# Patient Record
Sex: MALE | Race: WHITE | URBAN - METROPOLITAN AREA
[De-identification: names, ages, dates, MRNs, and addresses within clinical notes are randomized per-mention and may not be internally consistent; named-entity substitution may affect disease eponyms.]

---

## 2018-02-06 ENCOUNTER — TRANSFERRED RECORDS (OUTPATIENT)
Dept: HEALTH INFORMATION MANAGEMENT | Facility: CLINIC | Age: 83
End: 2018-02-06

## 2018-02-17 ENCOUNTER — APPOINTMENT (OUTPATIENT)
Dept: CT IMAGING | Facility: CLINIC | Age: 83
End: 2018-02-17
Attending: FAMILY MEDICINE
Payer: COMMERCIAL

## 2018-02-17 ENCOUNTER — HOSPITAL ENCOUNTER (EMERGENCY)
Facility: CLINIC | Age: 83
Discharge: HOME | End: 2018-02-17
Attending: FAMILY MEDICINE | Admitting: FAMILY MEDICINE
Payer: COMMERCIAL

## 2018-02-17 VITALS
TEMPERATURE: 98 F | OXYGEN SATURATION: 96 % | SYSTOLIC BLOOD PRESSURE: 149 MMHG | RESPIRATION RATE: 14 BRPM | DIASTOLIC BLOOD PRESSURE: 86 MMHG

## 2018-02-17 DIAGNOSIS — R10.9 ACUTE LEFT FLANK PAIN: ICD-10-CM

## 2018-02-17 DIAGNOSIS — R33.9 URINARY RETENTION WITH INCOMPLETE BLADDER EMPTYING: ICD-10-CM

## 2018-02-17 LAB
ALBUMIN SERPL-MCNC: 3.4 G/DL (ref 3.4–5)
ALBUMIN UR-MCNC: NEGATIVE MG/DL
ALP SERPL-CCNC: 76 U/L (ref 40–150)
ALT SERPL W P-5'-P-CCNC: 17 U/L (ref 0–70)
ANION GAP SERPL CALCULATED.3IONS-SCNC: 8 MMOL/L (ref 3–14)
APPEARANCE UR: CLEAR
AST SERPL W P-5'-P-CCNC: 14 U/L (ref 0–45)
BACTERIA #/AREA URNS HPF: ABNORMAL /HPF
BASOPHILS # BLD AUTO: 0 10E9/L (ref 0–0.2)
BASOPHILS NFR BLD AUTO: 0.2 %
BILIRUB SERPL-MCNC: 1.1 MG/DL (ref 0.2–1.3)
BILIRUB UR QL STRIP: NEGATIVE
BUN SERPL-MCNC: 26 MG/DL (ref 7–30)
CALCIUM SERPL-MCNC: 8.6 MG/DL (ref 8.5–10.1)
CHLORIDE SERPL-SCNC: 111 MMOL/L (ref 94–109)
CO2 SERPL-SCNC: 25 MMOL/L (ref 20–32)
COLOR UR AUTO: YELLOW
CREAT SERPL-MCNC: 1.35 MG/DL (ref 0.66–1.25)
DIFFERENTIAL METHOD BLD: ABNORMAL
EOSINOPHIL # BLD AUTO: 0.1 10E9/L (ref 0–0.7)
EOSINOPHIL NFR BLD AUTO: 1.6 %
ERYTHROCYTE [DISTWIDTH] IN BLOOD BY AUTOMATED COUNT: 15.9 % (ref 10–15)
GFR SERPL CREATININE-BSD FRML MDRD: 50 ML/MIN/1.7M2
GLUCOSE SERPL-MCNC: 103 MG/DL (ref 70–99)
GLUCOSE UR STRIP-MCNC: NEGATIVE MG/DL
HCT VFR BLD AUTO: 39.1 % (ref 40–53)
HGB BLD-MCNC: 11.9 G/DL (ref 13.3–17.7)
HGB UR QL STRIP: ABNORMAL
IMM GRANULOCYTES # BLD: 0 10E9/L (ref 0–0.4)
IMM GRANULOCYTES NFR BLD: 0.1 %
KETONES UR STRIP-MCNC: NEGATIVE MG/DL
LEUKOCYTE ESTERASE UR QL STRIP: NEGATIVE
LYMPHOCYTES # BLD AUTO: 0.9 10E9/L (ref 0.8–5.3)
LYMPHOCYTES NFR BLD AUTO: 10.5 %
MCH RBC QN AUTO: 27.4 PG (ref 26.5–33)
MCHC RBC AUTO-ENTMCNC: 30.4 G/DL (ref 31.5–36.5)
MCV RBC AUTO: 90 FL (ref 78–100)
MONOCYTES # BLD AUTO: 0.7 10E9/L (ref 0–1.3)
MONOCYTES NFR BLD AUTO: 8.4 %
MUCOUS THREADS #/AREA URNS LPF: PRESENT /LPF
NEUTROPHILS # BLD AUTO: 6.6 10E9/L (ref 1.6–8.3)
NEUTROPHILS NFR BLD AUTO: 79.2 %
NITRATE UR QL: NEGATIVE
PH UR STRIP: 5 PH (ref 5–7)
PLATELET # BLD AUTO: 143 10E9/L (ref 150–450)
POTASSIUM SERPL-SCNC: 3.8 MMOL/L (ref 3.4–5.3)
PROT SERPL-MCNC: 6.6 G/DL (ref 6.8–8.8)
RBC # BLD AUTO: 4.34 10E12/L (ref 4.4–5.9)
RBC #/AREA URNS AUTO: 13 /HPF (ref 0–2)
SODIUM SERPL-SCNC: 144 MMOL/L (ref 133–144)
SOURCE: ABNORMAL
SP GR UR STRIP: 1.01 (ref 1–1.03)
UROBILINOGEN UR STRIP-MCNC: 0 MG/DL (ref 0–2)
WBC # BLD AUTO: 8.4 10E9/L (ref 4–11)
WBC #/AREA URNS AUTO: 1 /HPF (ref 0–2)

## 2018-02-17 PROCEDURE — 99284 EMERGENCY DEPT VISIT MOD MDM: CPT | Mod: 25 | Performed by: FAMILY MEDICINE

## 2018-02-17 PROCEDURE — 80053 COMPREHEN METABOLIC PANEL: CPT | Performed by: FAMILY MEDICINE

## 2018-02-17 PROCEDURE — 51798 US URINE CAPACITY MEASURE: CPT | Performed by: FAMILY MEDICINE

## 2018-02-17 PROCEDURE — 81001 URINALYSIS AUTO W/SCOPE: CPT | Performed by: FAMILY MEDICINE

## 2018-02-17 PROCEDURE — 85025 COMPLETE CBC W/AUTO DIFF WBC: CPT | Performed by: FAMILY MEDICINE

## 2018-02-17 PROCEDURE — 99284 EMERGENCY DEPT VISIT MOD MDM: CPT | Mod: Z6 | Performed by: FAMILY MEDICINE

## 2018-02-17 PROCEDURE — 74176 CT ABD & PELVIS W/O CONTRAST: CPT

## 2018-02-17 PROCEDURE — 87086 URINE CULTURE/COLONY COUNT: CPT | Performed by: FAMILY MEDICINE

## 2018-02-17 RX ORDER — TRIAMCINOLONE ACETONIDE 1 MG/G
CREAM TOPICAL 2 TIMES DAILY
COMMUNITY

## 2018-02-17 RX ORDER — DORZOLAMIDE HCL 20 MG/ML
1 SOLUTION/ DROPS OPHTHALMIC
COMMUNITY

## 2018-02-17 RX ORDER — LORATADINE 10 MG/1
10 TABLET ORAL DAILY
COMMUNITY

## 2018-02-17 RX ORDER — CIPROFLOXACIN 500 MG/1
500 TABLET, FILM COATED ORAL 2 TIMES DAILY
Qty: 14 TABLET | Refills: 0 | Status: SHIPPED | OUTPATIENT
Start: 2018-02-17 | End: 2018-02-24

## 2018-02-17 RX ORDER — GUAIFENESIN 200 MG/1
400 TABLET ORAL 3 TIMES DAILY PRN
COMMUNITY

## 2018-02-17 ASSESSMENT — ENCOUNTER SYMPTOMS
FEVER: 0
CHILLS: 0
BLOOD IN STOOL: 0
NAUSEA: 1
DIARRHEA: 0
FREQUENCY: 1
DIFFICULTY URINATING: 1
VOMITING: 0
WHEEZING: 0
ABDOMINAL DISTENTION: 1
ABDOMINAL PAIN: 1
CONSTIPATION: 0
FLANK PAIN: 1
CHEST TIGHTNESS: 0
SORE THROAT: 0
DYSURIA: 0
COUGH: 0
SHORTNESS OF BREATH: 0
PALPITATIONS: 0
HEMATURIA: 0

## 2018-02-17 NOTE — ED NOTES
Pain Eval  Location of Pain: left flank  Duration of Pain: several hours  Rating of Pain: 8/10  Pain is better with:   Pain is worse with:  Treatment so far consists of: attempting to void-Bladder Scan showing 400 cc in bladder

## 2018-02-17 NOTE — DISCHARGE INSTRUCTIONS
Thank you for giving us the opportunity to see you. The impression is that you were having left flank pain with evidence for urinary retention with incomplete bladder emptying.  You may have an underlying urinary tract or prostate infection.    Take Cipro 500 mg twice a day for 7 days.  Monitor for any further urinary problems.  Return to the emergency department if you are unable to void.    A urine culture is pending. We will call you if your plan of care needs to change.     If you are not seeing an improvement within 3-5 days, please follow up with your primary care provider or clinic.     After discharge, please closely monitor for any new or worsening symptoms. Return to the Emergency Department at any time if your symptoms worsen.

## 2018-02-17 NOTE — ED PROVIDER NOTES
"  History     Chief Complaint   Patient presents with     Flank Pain     Urinary Retention     The history is provided by the patient and the spouse. No  was used.     Guilherme Cervantes is a 88 year old male with PMH urinary retention and blood clot in left leg on Apixaban who presents via EMS with 1 day acutely worsening urinary retention and 12 hours of left-sided flank pain.   Patient started experiencing weaker stream this fall (per wife) and is on Finasteride and Terazosin. Yesterday, noticed he was having more problems emptying his bladder and had dribbling. When he laid down for bed, started having severe (8-9/10) left-sided flank pain. He described it as a \"shocker to the kidney\". It was waxing and waning in quality. Started in his back, has migrated towards anterior flank and suprapubic area. Pain worsened with movement this morning, and patient decided to come in to ED. [place] some nausea this morning, no vomiting. Currently rates pain at a 3/10, saying he is okay laying down.   He said he had a few-second episode of similar pain earlier this week, but it resolved quickly.   Denies dysuria, malodorous urine, change in urine color, fevers/chills.     Problem List:    There are no active problems to display for this patient.       Past Medical History:    History reviewed. No pertinent past medical history.    Past Surgical History:    History reviewed. No pertinent surgical history.    Family History:    No family history on file.    Social History:  Marital Status:   [2]  Social History   Substance Use Topics     Smoking status: Never Smoker     Smokeless tobacco: Never Used     Alcohol use No        Medications:      APIXABAN PO   dorzolamide (TRUSOPT) 2 % ophthalmic solution   FINASTERIDE PO   guaiFENesin (ORGANIDIN) 200 MG TABS tablet   TERAZOSIN HCL PO   loratadine (CLARITIN) 10 MG tablet   triamcinolone (KENALOG) 0.1 % cream   ciprofloxacin (CIPRO) 500 MG tablet "         Review of Systems   Constitutional: Negative for chills and fever.   HENT: Negative for congestion and sore throat.    Respiratory: Negative for cough, chest tightness, shortness of breath and wheezing.    Cardiovascular: Positive for leg swelling (blood clot in left leg). Negative for chest pain and palpitations.   Gastrointestinal: Positive for abdominal distention, abdominal pain and nausea. Negative for blood in stool, constipation, diarrhea and vomiting.   Genitourinary: Positive for difficulty urinating, flank pain and frequency. Negative for dysuria and hematuria.   Skin: Negative for rash.       Physical Exam   BP: 144/88  Heart Rate: 75  Temp: 98  F (36.7  C)  Resp: 14  SpO2: 96 %      Physical Exam   Constitutional: He appears well-developed and well-nourished. No distress.   HENT:   Head: Normocephalic and atraumatic.   Cardiovascular: Normal rate, regular rhythm and normal heart sounds.  Exam reveals no gallop and no friction rub.    No murmur heard.  Pulmonary/Chest: Effort normal and breath sounds normal. No respiratory distress. He has no wheezes. He has no rales.   Abdominal: Soft. Bowel sounds are normal. He exhibits distension. He exhibits no mass. There is tenderness (suprapubic, left anterior and posterior flank). There is CVA tenderness. There is no rebound and no guarding.   Skin: He is not diaphoretic.       ED Course     ED Course     Procedures            Critical Care time:  none       Labs Ordered and Resulted from Time of ED Arrival Up to the Time of Departure from the ED   URINE MACROSCOPIC WITH REFLEX TO MICRO - Abnormal; Notable for the following:        Result Value    Blood Urine Small (*)     RBC Urine 13 (*)     Bacteria Urine Few (*)     Mucous Urine Present (*)     All other components within normal limits   CBC WITH PLATELETS DIFFERENTIAL - Abnormal; Notable for the following:     RBC Count 4.34 (*)     Hemoglobin 11.9 (*)     Hematocrit 39.1 (*)     MCHC 30.4 (*)      RDW 15.9 (*)     Platelet Count 143 (*)     All other components within normal limits   COMPREHENSIVE METABOLIC PANEL - Abnormal; Notable for the following:     Chloride 111 (*)     Glucose 103 (*)     Creatinine 1.35 (*)     GFR Estimate 50 (*)     GFR Estimate If Black 60 (*)     Protein Total 6.6 (*)     All other components within normal limits   PERIPHERAL IV CATHETER   STRAIGHT CATH FOR URINE   URINE CULTURE AEROBIC BACTERIAL     Abd/pelvis CT - no contrast - Stone Protocol (Preliminary result) Result time: 02/17/18 09:36:53     Preliminary result by Interface, Radiant Ib (02/17/18 09:36:53)     Impression:     IMPRESSION:   1. Cholelithiasis.  2. Right nephrolithiasis. No evidence of obstructive urolithiasis.  3. Thickening of the urinary bladder wall. This could be due to  cystitis but malignancy could also cause this appearance.  4. Retroperitoneal lymphadenopathy.  5. Colonic diverticulosis. No evidence of acute diverticulitis.     Narrative:     CT ABDOMEN AND PELVIS WITHOUT CONTRAST   2/17/2018  9:20 AM     HISTORY: Left flank.    TECHNIQUE: No IV contrast material. Radiation dose for this scan was  reduced using automated exposure control, adjustment of the mA and/or  kV according to patient size, or iterative reconstruction technique.    COMPARISON: None.    FINDINGS: There is mild scarring or atelectasis at the left lung base.  Calcified stones in the gallbladder. Within limits of noncontrast  technique, no acute abnormality identified in the liver, spleen,  pancreas, or adrenal glands.    The right kidney is atrophic. There is a 2 mm nonobstructing stone in  the lower pole of the right kidney. There are left renal cysts. There  are either left renal parapelvic cysts or mild hydronephrosis. 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 No ureteral calculi  are seen. There are atherosclerotic changes in the aorta but no  evidence of aneurysm. The bladder is contracted and thick-walled.  The  prostate is enlarged and indents on the base of the bladder. There are  calcifications in the prostate. There are several enlarged  retroperitoneal lymph nodes. The small and large bowel are normal in  caliber. There is colonic diverticulosis but no evidence of acute  diverticulitis. No abdominal or pelvic fluid collections. No evidence  of free intraperitoneal air.         Assessments & Plan (with Medical Decision Making)  88 year old male with PMH urinary retention and blood clot in left leg on Apixaban presents with 1 day worsening urinary retention and 12 hours left flank pain. Patient is on Finasteride and Terazosin. Patient reports trouble emptying bladder yesterday and then severe left flank pain when he laid down at night. Worsening throughout the night and with movement, with some nausea this morning. Brought in via EMS. Denies fevers/chills.  /88  Temp 98  F (36.7  C) (Temporal)  Resp 14  SpO2 96%   On exam, vss. Tenderness in suprapubic and left anterior/posterior flank areas.   Bladder scan showed > 375 mL urine. Patient was straight cathed and felt immediate relief.   UA showed small blood with RBCs and few bacteria. Cr of 1.35 (unknown baseline) and Hgb of 11.9 (unknown baseline).   CT did not show any kidney stone, but left hydronephrosis vs. Left renal cysts noted. Also noted thickening of bladder wall (cystitis vs malignancy) and enlarged prostate with calcifications. Also noted retroperitoneal LAD.     Possible patient has urinary tract or prostate infection leading to worsening urinary retention and possible left-sided hydronephrosis. Urine culture is pending. Pain improved with straight-cath. Will treat for possible infection with 7-days of Ciprofloxacin. Patient to follow-up with PCP if not improved with antibiotics in 3-5 days. May need urology referral for prostate biopsy or resection. Instructed to return to ED if unable to void again.        I have reviewed the nursing  notes.    I have reviewed the findings, diagnosis, plan and need for follow up with the patient.    New Prescriptions    CIPROFLOXACIN (CIPRO) 500 MG TABLET    Take 1 tablet (500 mg) by mouth 2 times daily for 7 days       Final diagnoses:   Urinary retention with incomplete bladder emptying   Acute left flank pain     This document serves as a record of the services and decisions personally performed and made by Roopa Arora MD.  It was created on his/her behalf by Yolanda Martinez, a trained medical student and scribe.  The creation of this record is based on the provider's personal observations and the statements of the patient.     Yolanda Martinez, MS3  February 17, 2018    ATTENDING NOTE: I was personally and directly involved in patient care including obtaining the history, performing the physical exam, ordering and reviewing of laboratory tests, and decision-making process. Plan of care was discussed with the patient.      2/17/2018   [facility] EMERGENCY DEPARTMENT     Roopa Arora MD  02/17/18 5907

## 2018-02-17 NOTE — ED AVS SNAPSHOT
[facility] Emergency Department    [address] [place] [address]    Phone:  [phone]    Fax:  [phone]                                       Guilherme Cervantes   MRN: [id number]    Department:  [facility] Emergency Department   Date of Visit:  2/17/2018           Patient Information     Date Of Birth          6/14/1929        Your diagnoses for this visit were:     Urinary retention with incomplete bladder emptying     Acute left flank pain        You were seen by Roopa Arora MD.      Follow-up Information     Follow up with [facility] Emergency Department.    Specialty:  EMERGENCY MEDICINE    Why:  If symptoms worsen    Contact information:    [address]   [place] [address] [phone]    Additional information:    From [address]: Exit at [address] on [address] side of [place]. Turn right on [address]. Turn left at stoplight on [address]. [facility] will be in view two blocks ahead        Follow up with [facility] In 3 days.    Why:  if not improving    Contact information:    [address] [place] [address]          Discharge Instructions       Thank you for giving us the opportunity to see you. The impression is that you were having left flank pain with evidence for urinary retention with incomplete bladder emptying.  You may have an underlying urinary tract or prostate infection.    Take Cipro 500 mg twice a day for 7 days.  Monitor for any further urinary problems.  Return to the emergency department if you are unable to void.    A urine culture is pending. We will call you if your plan of care needs to change.     If you are not seeing an improvement within 3-5 days, please follow up with your primary care provider or clinic.     After discharge, please closely monitor for any new or worsening symptoms. Return to the Emergency Department at any time if your symptoms worsen.        Discharge  References/Attachments     URINARY RETENTION, MALE (ENGLISH)    FLANK PAIN, UNCERTAIN CAUSE (ENGLISH)      24 Hour Appointment Hotline       To make an appointment at any [facility] clinic, call [phone] ([phone]). If you don't have a family doctor or clinic, we will help you find one. [facility] clinics are conveniently located to serve the needs of you and your family.             Review of your medicines      START taking        Dose / Directions Last dose taken    ciprofloxacin 500 MG tablet   Commonly known as:  CIPRO   Dose:  500 mg   Quantity:  14 tablet        Take 1 tablet (500 mg) by mouth 2 times daily for 7 days   Refills:  0          Our records show that you are taking the medicines listed below. If these are incorrect, please call your family doctor or clinic.        Dose / Directions Last dose taken    APIXABAN PO   Dose:  5 mg        Take 5 mg by mouth 2 times daily   Refills:  0        dorzolamide 2 % ophthalmic solution   Commonly known as:  TRUSOPT   Dose:  1 drop        Place 1 drop into both eyes 2 times daily   Refills:  0        FINASTERIDE PO   Dose:  5 mg        Take 5 mg by mouth daily   Refills:  0        guaiFENesin 200 MG Tabs tablet   Commonly known as:  ORGANIDIN   Dose:  400 mg        Take 400 mg by mouth 3 times daily as needed for cough   Refills:  0        loratadine 10 MG tablet   Commonly known as:  CLARITIN   Dose:  10 mg        Take 10 mg by mouth daily   Refills:  0        TERAZOSIN HCL PO   Dose:  10 mg        Take 10 mg by mouth At Bedtime   Refills:  0        triamcinolone 0.1 % cream   Commonly known as:  KENALOG        Apply topically 2 times daily   Refills:  0                Prescriptions were sent or printed at these locations (1 Prescription)                   [facility] [address] [place] [address] [address]  AT [facility] [address]   [address] , [facility] [address]    Telephone:  [phone]   Fax:  [phone]   Hours:       "            E-Prescribed (1 of 1)         ciprofloxacin (CIPRO) 500 MG tablet                Procedures and tests performed during your visit     Abd/pelvis CT - no contrast - Stone Protocol    CBC with platelets differential    Comprehensive metabolic panel    Peripheral IV catheter    Straight cath for urine    UA reflex to Microscopic    Urine Culture Aerobic Bacterial      Orders Needing Specimen Collection     None      Pending Results     Date and Time Order Name Status Description    2/17/2018 1013 Urine Culture Aerobic Bacterial In process     2/17/2018 0841 Abd/pelvis CT - no contrast - Stone Protocol Preliminary             Pending Culture Results     Date and Time Order Name Status Description    2/17/2018 1013 Urine Culture Aerobic Bacterial In process             Pending Results Instructions     If you had any lab results that were not finalized at the time of your Discharge, you can call the ED Lab Result RN at [phone]. You will be contacted by this team for any positive Lab results or changes in treatment. The nurses are available 7 days a week from 10A to 6:30P.  You can leave a message 24 hours per day and they will return your call.        Thank you for choosing [facility]       Thank you for choosing [facility] for your care. Our goal is always to provide you with excellent care. Hearing back from our patients is one way we can continue to improve our services. Please take a few minutes to complete the written survey that you may receive in the mail after you visit with us. Thank you!        [employer] Information     [employer] lets you send messages to your doctor, view your test results, renew your prescriptions, schedule appointments and more. To sign up, go to [email or web address] . Click on \"Log in\" on the left side of the screen, which will take you to the Welcome page. Then click on \"Sign up Now\" on the right side of the page.     You will be asked to enter the access code listed below, as " well as some personal information. Please follow the directions to create your username and password.     Your access code is: [email or web address]  Expires: 2018 10:41 AM     Your access code will  in 90 days. If you need help or a new code, please call your [facility] clinic or [phone].        Care EveryWhere ID     This is your Care EveryWhere ID. This could be used by other organizations to access your [facility] medical records  [id number]        Equal Access to Services     MARGY SONG : Arnold raglando Sogianna, waaxjenny luqadaha, qaybta kaalmajenny millan, sharita romero . So [facility] [phone].    ATENCIÓN: Si habla español, tiene a perez disposición servicios gratuitos de asistencia lingüística. Llame al [phone].    We comply with applicable federal civil rights laws and [place] laws. We do not discriminate on the basis of race, color, national origin, age, disability, sex, sexual orientation, or gender identity.            After Visit Summary       This is your record. Keep this with you and show to your community pharmacist(s) and doctor(s) at your next visit.

## 2018-02-17 NOTE — ED AVS SNAPSHOT
[facility] Emergency Department    [address] [place] [address]    Phone:  [phone]    Fax:  [phone]                                       Guilherme Cervantes   MRN: [id number]    Department:  [facility] Emergency Department   Date of Visit:  2/17/2018           After Visit Summary Signature Page     I have received my discharge instructions, and my questions have been answered. I have discussed any challenges I see with this plan with the nurse or doctor.    ..........................................................................................................................................  Patient/Patient Representative Signature      ..........................................................................................................................................  Patient Representative Print Name and Relationship to Patient    ..................................................               ................................................  Date                                            Time    ..........................................................................................................................................  Reviewed by Signature/Title    ...................................................              ..............................................  Date                                                            Time

## 2018-02-17 NOTE — ED NOTES
Presents via EMS with urinary retention. Dribbling urine through out the night. Issues with urinary frequency started yesterday.

## 2018-02-18 LAB
BACTERIA SPEC CULT: NO GROWTH
Lab: NORMAL
SPECIMEN SOURCE: NORMAL

## 2018-02-28 ENCOUNTER — OFFICE VISIT (OUTPATIENT)
Dept: UROLOGY | Facility: OTHER | Age: 83
End: 2018-02-28
Payer: COMMERCIAL

## 2018-02-28 VITALS — WEIGHT: 177.25 LBS | RESPIRATION RATE: 16 BRPM | SYSTOLIC BLOOD PRESSURE: 122 MMHG | DIASTOLIC BLOOD PRESSURE: 68 MMHG

## 2018-02-28 DIAGNOSIS — N40.1 BENIGN PROSTATIC HYPERPLASIA WITH LOWER URINARY TRACT SYMPTOMS, SYMPTOM DETAILS UNSPECIFIED: Primary | ICD-10-CM

## 2018-02-28 DIAGNOSIS — N40.2 PROSTATE NODULE: ICD-10-CM

## 2018-02-28 PROCEDURE — 99204 OFFICE O/P NEW MOD 45 MIN: CPT | Mod: 25 | Performed by: UROLOGY

## 2018-02-28 PROCEDURE — 51798 US URINE CAPACITY MEASURE: CPT | Performed by: UROLOGY

## 2018-02-28 RX ORDER — GUAIFENESIN 400 MG/1
TABLET ORAL
COMMUNITY
Start: 2017-11-08 | End: 2018-11-09

## 2018-02-28 RX ORDER — FINASTERIDE 5 MG/1
TABLET, FILM COATED ORAL
COMMUNITY
Start: 2017-11-22 | End: 2018-11-23

## 2018-02-28 RX ORDER — TRIAMCINOLONE ACETONIDE 1 MG/G
CREAM TOPICAL
COMMUNITY
Start: 2018-02-06 | End: 2019-02-07

## 2018-02-28 RX ORDER — TERAZOSIN 10 MG/1
CAPSULE ORAL
COMMUNITY
Start: 2017-11-22 | End: 2018-11-23

## 2018-02-28 RX ORDER — DORZOLAMIDE HCL 20 MG/ML
SOLUTION/ DROPS OPHTHALMIC
COMMUNITY
Start: 2017-11-08 | End: 2018-11-09

## 2018-02-28 ASSESSMENT — PAIN SCALES - GENERAL: PAINLEVEL: NO PAIN (0)

## 2018-02-28 NOTE — PATIENT INSTRUCTIONS
Your cystoscopy is scheduled 3/28/18 @ 10:00 Please call  if you need to reschedule this appointment.  .  Cystoscopy    Cystoscopy is a procedure that lets your doctor look directly inside your urethra and bladder. It can be used to:    Help diagnose a problem with your urethra, bladder, or kidneys.    Take a sample (biopsy) of bladder or urethral tissue.    Treat certain problems (such as removing kidney stones).    Place a stent to bypass an obstruction.    Take special X-rays of the kidneys.  Based on the findings, your doctor may recommend other tests or treatments.  What is a cystoscope?  A cystoscope is a telescope-like instrument that contains lenses and fiberoptics (small glass wires that make bright light). The cystoscope may be straight and rigid, or flexible to bend around curves in the urethra. The doctor may look directly into the cystoscope, or project the image onto a monitor.  Getting ready    Ask your doctor if you should stop taking any medicines before the procedure.    Ask whether you should avoid eating or drinking anything after midnight before the procedure.    Follow any other instructions your doctor gives you.  Tell your doctor before the exam if you:    Take any medicines, such as aspirin or blood thinners    Have allergies to any medicines    Are pregnant   The procedure  Cystoscopy is done in the doctor s office, surgery center, or hospital. The doctor and a nurse are present during the procedure. It takes only a few minutes, longer if a biopsy, X-ray, or treatment needs to be done.  During the procedure:    You lie on an exam table on your back, knees bent and legs apart. You are covered with a drape.    Your urethra and the area around it are washed. Anesthetic jelly may be applied to numb the urethra. Other pain medicine is usually not needed. In some cases, you may be offered a mild sedative to help you relax. If a more extensive procedure is to be done, such as a biopsy  or kidney stone removal, general anesthesia may be needed.    The cystoscope is inserted. A sterile fluid is put into the bladder to expand it. You may feel pressure from this fluid.    When the procedure is done, the cystoscope is removed.  After the procedure  If you had a sedative, general anesthesia, or spinal anesthesia, you must have someone drive you home. Once you re home:    Drink plenty of fluids.    You may have burning or light bleeding when you urinate--this is normal.    Medicines may be prescribed to ease any discomfort or prevent infection. Take these as directed.    Call your doctor if you have heavy bleeding or blood clots, burning that lasts more than a day, a fever over 100 F  (38  C), or trouble urinating.  Date Last Reviewed: 1/1/2017 2000-2017 [employer]. [address] [place] [address], [place] [address]. All rights reserved. This information is not intended as a substitute for professional medical care. Always follow your healthcare professional's instructions.

## 2018-02-28 NOTE — MR AVS SNAPSHOT
After Visit Summary   2/28/2018    Guilherme Cervantes    MRN: [id number]           Patient Information     Date Of Birth          6/14/1929        Visit Information        Provider Department      2/28/2018 8:45 AM David Amos MD [facility] Instructions    Your cystoscopy is scheduled 3/28/18 @ 10:00 Please call  if you need to reschedule this appointment.  .  Cystoscopy    Cystoscopy is a procedure that lets your doctor look directly inside your urethra and bladder. It can be used to:    Help diagnose a problem with your urethra, bladder, or kidneys.    Take a sample (biopsy) of bladder or urethral tissue.    Treat certain problems (such as removing kidney stones).    Place a stent to bypass an obstruction.    Take special X-rays of the kidneys.  Based on the findings, your doctor may recommend other tests or treatments.  What is a cystoscope?  A cystoscope is a telescope-like instrument that contains lenses and fiberoptics (small glass wires that make bright light). The cystoscope may be straight and rigid, or flexible to bend around curves in the urethra. The doctor may look directly into the cystoscope, or project the image onto a monitor.  Getting ready    Ask your doctor if you should stop taking any medicines before the procedure.    Ask whether you should avoid eating or drinking anything after midnight before the procedure.    Follow any other instructions your doctor gives you.  Tell your doctor before the exam if you:    Take any medicines, such as aspirin or blood thinners    Have allergies to any medicines    Are pregnant   The procedure  Cystoscopy is done in the doctor s office, surgery center, or hospital. The doctor and a nurse are present during the procedure. It takes only a few minutes, longer if a biopsy, X-ray, or treatment needs to be done.  During the procedure:    You lie on an exam table on your back, knees bent and legs  apart. You are covered with a drape.    Your urethra and the area around it are washed. Anesthetic jelly may be applied to numb the urethra. Other pain medicine is usually not needed. In some cases, you may be offered a mild sedative to help you relax. If a more extensive procedure is to be done, such as a biopsy or kidney stone removal, general anesthesia may be needed.    The cystoscope is inserted. A sterile fluid is put into the bladder to expand it. You may feel pressure from this fluid.    When the procedure is done, the cystoscope is removed.  After the procedure  If you had a sedative, general anesthesia, or spinal anesthesia, you must have someone drive you home. Once you re home:    Drink plenty of fluids.    You may have burning or light bleeding when you urinate--this is normal.    Medicines may be prescribed to ease any discomfort or prevent infection. Take these as directed.    Call your doctor if you have heavy bleeding or blood clots, burning that lasts more than a day, a fever over 100 F  (38  C), or trouble urinating.  Date Last Reviewed: 1/1/2017 2000-2017 [employer]. [address] [place] [address]. All rights reserved. This information is not intended as a substitute for professional medical care. Always follow your healthcare professional's instructions.                Follow-ups after your visit        Your next 10 appointments already scheduled     Mar 28, 2018 10:00 AM CDT   Return Visit with David Amos MD   [facility] ([facility])    [address] [id number] [phone]              Who to contact     If you have questions or need follow up information about today's clinic visit or your schedule please contact [facility] directly at [phone].  Normal or non-critical lab and imaging results will be communicated to you by MyChart, letter or phone within 4 business days after the clinic  "has received the results. If you do not hear from us within 7 days, please contact the clinic through [employer] or phone. If you have a critical or abnormal lab result, we will notify you by phone as soon as possible.  Submit refill requests through [employer] or call your pharmacy and they will forward the refill request to us. Please allow 3 business days for your refill to be completed.          Additional Information About Your Visit        [employer] Information     [employer] lets you send messages to your doctor, view your test results, renew your prescriptions, schedule appointments and more. To sign up, go to [email or web address] . Click on \"Log in\" on the left side of the screen, which will take you to the Welcome page. Then click on \"Sign up Now\" on the right side of the page.     You will be asked to enter the access code listed below, as well as some personal information. Please follow the directions to create your username and password.     Your access code is: [email or web address]  Expires: 2018 10:41 AM     Your access code will  in 90 days. If you need help or a new code, please call your [place] clinic or [phone].        Care EveryWhere ID     This is your Care EveryWhere ID. This could be used by other organizations to access your [place] medical records  [id number]        Your Vitals Were     Respirations                   16            Blood Pressure from Last 3 Encounters:   18 122/68   18 149/86    Weight from Last 3 Encounters:   18 80.4 kg (177 lb 4 oz)              Today, you had the following     No orders found for display       Primary Care Provider Fax #    [facility] [phone] [address] [address]        Equal Access to Services     MARGY SONG : Arnold Wetzel, robin mcintosh, sharita jacobs. So [facility] [phone].    ATENCIÓN: Si habla español tiene " a perez disposición servicios gratuitos de asistencia lingüística. Ashwini ziegler [phone].    We comply with applicable federal civil rights laws and [place] laws. We do not discriminate on the basis of race, color, national origin, age, disability, sex, sexual orientation, or gender identity.            Thank you!     Thank you for choosing [facility]  for your care. Our goal is always to provide you with excellent care. Hearing back from our patients is one way we can continue to improve our services. Please take a few minutes to complete the written survey that you may receive in the mail after your visit with us. Thank you!             Your Updated Medication List - Protect others around you: Learn how to safely use, store and throw away your medicines at www.disposemymeds.org.          This list is accurate as of 2/28/18  9:29 AM.  Always use your most recent med list.                   Brand Name Dispense Instructions for use Diagnosis    * APIXABAN PO      Take 5 mg by mouth 2 times daily        * apixaban ANTICOAGULANT 5 MG tablet    ELIQUIS     TAKE ONE TABLET BY MOUTH TWICE A DAY TO PREVENT BLOOD CLOTS        DAILY MULTIVITAMIN PO      TAKE 1 CAPSULE BY MOUTH TWICE A DAY        * dorzolamide 2 % ophthalmic solution    TRUSOPT     Place 1 drop into both eyes 2 times daily        * dorzolamide 2 % ophthalmic solution    TRUSOPT     INSTILL 1 DROP IN EACH EYE TWICE A DAY FOR INCREASED EYE PRESSURE        finasteride 5 MG tablet    PROSCAR     TAKE ONE TABLET BY MOUTH EVERY DAY FOR PROSTATE        FINASTERIDE PO      Take 5 mg by mouth daily        * guaiFENesin 200 MG Tabs tablet    ORGANIDIN     Take 400 mg by mouth 3 times daily as needed for cough        * guaiFENesin 400 MG Tabs      TAKE ONE TABLET BY MOUTH THREE TIMES A DAY AS NEEDED CONGESTION        loratadine 10 MG tablet    CLARITIN     Take 10 mg by mouth daily        * TERAZOSIN HCL PO      Take 10 mg by mouth At Bedtime        *  terazosin 10 MG capsule    HYTRIN     TAKE TWO CAPSULES BY MOUTH AT BEDTIME FOR PROSTATE        * triamcinolone 0.1 % cream    KENALOG     Apply topically 2 times daily        * triamcinolone 0.1 % cream    KENALOG     APPLY THIN FILM TO AFFECTED AREA TWICE A DAY AS NEEDED FOR RASH        * Notice:  This list has 10 medication(s) that are the same as other medications prescribed for you. Read the directions carefully, and ask your doctor or other care provider to review them with you.

## 2018-02-28 NOTE — NURSING NOTE
Chief Complaint   Patient presents with     Consult     Urinary retention       Initial /68 (BP Location: Right arm, Patient Position: Sitting, Cuff Size: Adult Regular)  Resp 16  Wt 177 lb 4 oz (80.4 kg) There is no height or weight on file to calculate BMI.  Medication Reconciliation: complete     Miri Saucedo, CMA

## 2018-02-28 NOTE — PROGRESS NOTES
Bladder Scan performed. 12 ml maximum residual urine detected after 3 scans. MD informed.    Miri Saucedo, CMA

## 2018-02-28 NOTE — PROGRESS NOTES
S: Guilherme Cervantes is a pleasant  88 year old male who was seen  for a consult with regard to patient's urinary complaints.  Patient complains of recent urinary retention.  He had bladder drainage done in the ER.  He is on terazosin and possible finasteride.   Symptoms have been on going for  many years(s).  Seems to be worsened over time.  His AUA Symptom Score:  23.  His QOL score:  6.    Current Outpatient Prescriptions   Medication Sig Dispense Refill     apixaban ANTICOAGULANT (ELIQUIS) 5 MG tablet TAKE ONE TABLET BY MOUTH TWICE A DAY TO PREVENT BLOOD CLOTS       dorzolamide (TRUSOPT) 2 % ophthalmic solution INSTILL 1 DROP IN EACH EYE TWICE A DAY FOR INCREASED EYE PRESSURE       finasteride (PROSCAR) 5 MG tablet TAKE ONE TABLET BY MOUTH EVERY DAY FOR PROSTATE       guaiFENesin 400 MG TABS TAKE ONE TABLET BY MOUTH THREE TIMES A DAY AS NEEDED CONGESTION       terazosin (HYTRIN) 10 MG capsule TAKE TWO CAPSULES BY MOUTH AT BEDTIME FOR PROSTATE       triamcinolone (KENALOG) 0.1 % cream APPLY THIN FILM TO AFFECTED AREA TWICE A DAY AS NEEDED FOR RASH       Multiple Vitamins-Minerals (DAILY MULTIVITAMIN PO) TAKE 1 CAPSULE BY MOUTH TWICE A DAY       APIXABAN PO Take 5 mg by mouth 2 times daily       dorzolamide (TRUSOPT) 2 % ophthalmic solution Place 1 drop into both eyes 2 times daily       FINASTERIDE PO Take 5 mg by mouth daily       guaiFENesin (ORGANIDIN) 200 MG TABS tablet Take 400 mg by mouth 3 times daily as needed for cough       TERAZOSIN HCL PO Take 10 mg by mouth At Bedtime       loratadine (CLARITIN) 10 MG tablet Take 10 mg by mouth daily       triamcinolone (KENALOG) 0.1 % cream Apply topically 2 times daily       Allergies   Allergen Reactions     Trospium Diarrhea     Yeast Hives     Past Medical History:   Diagnosis Date     DVT (deep venous thrombosis) (H)      Past Surgical History:   Procedure Laterality Date     back surgery for spinal stenosis       KNEE SURGERY        No family history on  file.  He does not have a family history of prostate cancer.  Social History     Social History     Marital status:      Spouse name: N/A     Number of children: N/A     Years of education: N/A     Social History Main Topics     Smoking status: Former Smoker     Smokeless tobacco: Never Used     Alcohol use No     Drug use: No     Sexual activity: Not Asked     Other Topics Concern     None     Social History Narrative        REVIEW OF SYSTEMS  =================  C: NEGATIVE for fever, chills, change in weight  I: NEGATIVE for worrisome rashes, moles or lesions  E/M: NEGATIVE for ear, mouth and throat problems  R: NEGATIVE for significant cough or SHORTNESS OF BREATH  CV:  NEGATIVE for chest pain, palpitations or peripheral edema  GI: NEGATIVE for nausea, abdominal pain, heartburn, or change in bowel habits  NEURO: NEGATIVE numbness/weakness  : see HPI  PSYCH: NEGATIVE depression/anxiety  LYmph: no new enlarged lymph nodes  Ortho: no new trauma/movements           O: Exam:  Constitutional: healthy, alert and no distress  Head: Normocephalic. No masses, lesions, tenderness or abnormalities  ENT: ENT exam normal, no neck nodes or sinus tenderness  Cardiovascular: negative, PMI normal.   Respiratory: negative, no evidence of respiratory distress  Gastrointestinal: Abdomen soft, non-tender. BS normal. No masses, organomegaly  : penis no d/c. Testis no masses.  No scrotal skin lesion.  Prostate 50 gm plus hard nodule.  Musculoskeletal: extremities normal- no gross deformities noted, gait normal and normal muscle tone  Skin: no suspicious lesions or rashes  Neurologic: Alert and oriented  Psychiatric: mentation appears normal. and affect normal/bright  Hematologic/Lymphatic/Immunologic: normal ant/post cervical, axillary, supraclavicular and inguinal nodes    Assessment/Plan:   (N40.1) Benign prostatic hyperplasia with lower urinary tract symptoms, symptom details unspecified  (primary encounter  diagnosis)  Comment: bladder scan 14 ml.  Recent urinary retention  Plan: cont with meds           Schedule for cysto next           Discussed TURP    (N40.2) Prostate nodule  Comment:    Plan: abnormal exam discussed          Discussed pros and cons with pursuing prostate ca           He and his wife do not want further investigations.

## 2018-03-05 ENCOUNTER — OFFICE VISIT (OUTPATIENT)
Dept: FAMILY MEDICINE | Facility: OTHER | Age: 83
End: 2018-03-05
Payer: COMMERCIAL

## 2018-03-05 VITALS
DIASTOLIC BLOOD PRESSURE: 72 MMHG | HEART RATE: 94 BPM | OXYGEN SATURATION: 98 % | BODY MASS INDEX: 27.47 KG/M2 | SYSTOLIC BLOOD PRESSURE: 124 MMHG | HEIGHT: 67 IN | TEMPERATURE: 98.1 F | RESPIRATION RATE: 16 BRPM | WEIGHT: 175 LBS

## 2018-03-05 DIAGNOSIS — N40.1 BENIGN PROSTATIC HYPERPLASIA WITH URINARY OBSTRUCTION: Primary | ICD-10-CM

## 2018-03-05 DIAGNOSIS — R33.9 URINARY RETENTION: ICD-10-CM

## 2018-03-05 DIAGNOSIS — N13.8 BENIGN PROSTATIC HYPERPLASIA WITH URINARY OBSTRUCTION: Primary | ICD-10-CM

## 2018-03-05 PROBLEM — C44.310 BASAL CELL CARCINOMA OF FACE: Status: ACTIVE | Noted: 2018-03-05

## 2018-03-05 PROBLEM — K57.30 DIVERTICULOSIS OF COLON: Status: ACTIVE | Noted: 2018-03-05

## 2018-03-05 PROBLEM — H35.54 RETINAL PIGMENT EPITHELIAL ATROPHY: Status: ACTIVE | Noted: 2018-03-05

## 2018-03-05 PROBLEM — I10 ESSENTIAL HYPERTENSION: Status: ACTIVE | Noted: 2018-03-05

## 2018-03-05 PROBLEM — H34.8392 BRANCH RETINAL VEIN OCCLUSION (H): Status: ACTIVE | Noted: 2018-03-05

## 2018-03-05 PROBLEM — C44.211: Status: ACTIVE | Noted: 2018-03-05

## 2018-03-05 PROBLEM — H35.3230 BILATERAL EXUDATIVE AGE-RELATED MACULAR DEGENERATION (H): Status: ACTIVE | Noted: 2018-03-05

## 2018-03-05 PROBLEM — Z96.1 PSEUDOPHAKIA: Status: ACTIVE | Noted: 2018-03-05

## 2018-03-05 PROBLEM — H54.8 LEGAL BLINDNESS: Status: ACTIVE | Noted: 2018-03-05

## 2018-03-05 PROBLEM — H40.9 GLAUCOMA: Status: ACTIVE | Noted: 2018-03-05

## 2018-03-05 PROBLEM — I82.409 ACUTE DEEP VEIN THROMBOSIS (DVT) (H): Status: ACTIVE | Noted: 2018-03-05

## 2018-03-05 PROBLEM — Z96.659 KNEE JOINT REPLACED BY OTHER MEANS: Status: ACTIVE | Noted: 2018-03-05

## 2018-03-05 PROBLEM — H93.19 SUBJECTIVE TINNITUS: Status: ACTIVE | Noted: 2018-03-05

## 2018-03-05 PROBLEM — Z80.0 FAMILY HISTORY OF MALIGNANT NEOPLASM OF GASTROINTESTINAL TRACT: Status: ACTIVE | Noted: 2018-03-05

## 2018-03-05 PROBLEM — M48.02 SPINAL STENOSIS OF CERVICAL REGION: Status: ACTIVE | Noted: 2018-03-05

## 2018-03-05 PROBLEM — L57.0 ACTINIC KERATOSIS: Status: ACTIVE | Noted: 2018-03-05

## 2018-03-05 PROBLEM — J44.9 COPD (CHRONIC OBSTRUCTIVE PULMONARY DISEASE) (H): Status: ACTIVE | Noted: 2018-03-05

## 2018-03-05 PROBLEM — H90.3 SENSORINEURAL HEARING LOSS (SNHL) OF BOTH EARS: Status: ACTIVE | Noted: 2018-03-05

## 2018-03-05 PROBLEM — N40.0 ENLARGED PROSTATE: Status: ACTIVE | Noted: 2018-03-05

## 2018-03-05 PROBLEM — H25.819 COMBINED FORMS OF AGE-RELATED CATARACT: Status: ACTIVE | Noted: 2018-03-05

## 2018-03-05 PROBLEM — J45.909 ASTHMA WITHOUT STATUS ASTHMATICUS: Status: ACTIVE | Noted: 2018-03-05

## 2018-03-05 PROCEDURE — 51701 INSERT BLADDER CATHETER: CPT | Performed by: FAMILY MEDICINE

## 2018-03-05 PROCEDURE — 99213 OFFICE O/P EST LOW 20 MIN: CPT | Mod: 25 | Performed by: FAMILY MEDICINE

## 2018-03-05 NOTE — MR AVS SNAPSHOT
"              After Visit Summary   3/5/2018    Guilherme Cervantes    MRN: [id number]           Patient Information     Date Of Birth          6/14/1929        Visit Information        Provider Department      3/5/2018 11:45 AM Jannie Aparicio MD [facility]        Care Instructions    Drained the bladder today    Continue taking all of you medications, including the Finasteride.     Follow up with Dr. Amos at the end of the month for the cystoscopy.           Follow-ups after your visit        Follow-up notes from your care team     Return if symptoms worsen or fail to improve.      Your next 10 appointments already scheduled     Mar 28, 2018 10:00 AM CDT   Return Visit with David Amos MD   [facility] ([facility])    [address] [facility] [address] [facility] [address] [phone]              Who to contact     If you have questions or need follow up information about today's clinic visit or your schedule please contact [facility] directly at [phone].  Normal or non-critical lab and imaging results will be communicated to you by [employer], letter or phone within 4 business days after the clinic has received the results. If you do not hear from us within 7 days, please contact the clinic through [employer] or phone. If you have a critical or abnormal lab result, we will notify you by phone as soon as possible.  Submit refill requests through [employer] or call your pharmacy and they will forward the refill request to us. Please allow 3 business days for your refill to be completed.          Additional Information About Your Visit        [employer] Information     [employer] lets you send messages to your doctor, view your test results, renew your prescriptions, schedule appointments and more. To sign up, go to [email or web address] . Click on \"Log in\" on the left side of the screen, which will take you to the Welcome page. Then click on \"Sign up Now\" on " "the right side of the page.     You will be asked to enter the access code listed below, as well as some personal information. Please follow the directions to create your username and password.     Your access code is: [email or web address]  Expires: 2018 10:41 AM     Your access code will  in 90 days. If you need help or a new code, please call your [place] clinic or [phone].        Care EveryWhere ID     This is your Care EveryWhere ID. This could be used by other organizations to access your [place] medical records  [id number]        Your Vitals Were     Pulse Temperature Respirations Height Pulse Oximetry BMI (Body Mass Index)    94 98.1  F (36.7  C) (Temporal) 16 1.702 m (5' 7\") 98% 27.41 kg/m2       Blood Pressure from Last 3 Encounters:   18 124/72   18 122/68   18 149/86    Weight from Last 3 Encounters:   18 79.4 kg (175 lb)   18 80.4 kg (177 lb 4 oz)              Today, you had the following     No orders found for display       Primary Care Provider Fax #    [facility] [phone] [address] [address]        Equal Access to Services     MARGY SONG : Hadii aad ku hadasho Soomaali, waaxda luqadaha, qaybta kaalmada adeegyada, waxay idiin haydebra romero . So [facility] [phone].    ATENCIÓN: Si habla español, tiene a perez disposición servicios gratuitos de asistencia lingüística. Llame al [phone].    We comply with applicable federal civil rights laws and [place] laws. We do not discriminate on the basis of race, color, national origin, age, disability, sex, sexual orientation, or gender identity.            Thank you!     Thank you for choosing [facility]  for your care. Our goal is always to provide you with excellent care. Hearing back from our patients is one way we can continue to improve our services. Please take a few minutes to complete the written survey that you may receive in the mail " after your visit with us. Thank you!             Your Updated Medication List - Protect others around you: Learn how to safely use, store and throw away your medicines at www.disposemymeds.org.          This list is accurate as of 3/5/18 12:30 PM.  Always use your most recent med list.                   Brand Name Dispense Instructions for use Diagnosis    * APIXABAN PO      Take 5 mg by mouth 2 times daily        * apixaban ANTICOAGULANT 5 MG tablet    ELIQUIS     TAKE ONE TABLET BY MOUTH TWICE A DAY TO PREVENT BLOOD CLOTS        DAILY MULTIVITAMIN PO      TAKE 1 CAPSULE BY MOUTH TWICE A DAY        * dorzolamide 2 % ophthalmic solution    TRUSOPT     Place 1 drop into both eyes 2 times daily        * dorzolamide 2 % ophthalmic solution    TRUSOPT     INSTILL 1 DROP IN EACH EYE TWICE A DAY FOR INCREASED EYE PRESSURE        finasteride 5 MG tablet    PROSCAR     TAKE ONE TABLET BY MOUTH EVERY DAY FOR PROSTATE        FINASTERIDE PO      Take 5 mg by mouth daily        * guaiFENesin 200 MG Tabs tablet    ORGANIDIN     Take 400 mg by mouth 3 times daily as needed for cough        * guaiFENesin 400 MG Tabs      TAKE ONE TABLET BY MOUTH THREE TIMES A DAY AS NEEDED CONGESTION        loratadine 10 MG tablet    CLARITIN     Take 10 mg by mouth daily        * TERAZOSIN HCL PO      Take 10 mg by mouth At Bedtime        * terazosin 10 MG capsule    HYTRIN     TAKE TWO CAPSULES BY MOUTH AT BEDTIME FOR PROSTATE        * triamcinolone 0.1 % cream    KENALOG     Apply topically 2 times daily        * triamcinolone 0.1 % cream    KENALOG     APPLY THIN FILM TO AFFECTED AREA TWICE A DAY AS NEEDED FOR RASH        * Notice:  This list has 10 medication(s) that are the same as other medications prescribed for you. Read the directions carefully, and ask your doctor or other care provider to review them with you.

## 2018-03-05 NOTE — PROGRESS NOTES
VORB to straight cath patient.    Straight catheter insertion documentation on 3/5/2018:    Guilherme Cervantes presents to the clinic for urinary retention and unable to void  Reason for insertion: urinary retention  Order has been verified. AE VORB  Catheter successfully inserted into the urethral meatus in the usual sterile fashion without immediate complication.  Type of catheter placed: 16 Azeri straight catheter  Urine is yellow in color slight pinkish with blood at the end of the stream  800 cc's of urine output returned.    The patient tolerated the procedure and was instructed to follow up with Dr Amos, monitor for pain or discomfort, return or call for pain, fever, leakage or decreased urine flow and watch for signs of infection    Mahogany Villalpando RN, BSN

## 2018-03-05 NOTE — Clinical Note
Needed to catheter drain Guilherme due to retention of 800 cc of urine.  Feels much better after, but was just drained 2 weeks ago, so thought I would update you. Jannie Aparicio MD

## 2018-03-05 NOTE — PROGRESS NOTES
SUBJECTIVE:   Guilherme Cervantes is a 88 year old male who presents to clinic today for the following health issues:    HPI  Genitourinary - Male  Onset: 1 day, has had issues as recently as two weeks ago when he was seen at the hospital and needed to have his bladder drained    Description:   Dysuria (painful urination): yes  Hematuria (blood in urine): no   Frequency: yes - yesterday and last night  Are you urinating at night : yes  Hesitancy (delay in urine): no   Retention (unable to empty): YES  Decrease in urinary flow: no   Incontinence: no     Progression of Symptoms:  worsening    Accompanying Signs & Symptoms:  Fever: no   Back/Flank pain: YES  Urethral discharge: no   Testicle lumps/masses/pain: no   Nausea and/or vomiting: no   Abdominal pain: pressure    History:   History of frequent UTI's: YES  History of kidney stones: no   History of hernias: no   Personal or Family history of Prostate problems: YES- not cancer  Sexually active: no     Precipitating factors:   None    Alleviating factors:  None     He is thinking that today he will need to have his bladder drained. He saw Dr. Amos who recommended a cystoscopy, but they aren't scheduled until the end of the month.     Problem list and histories reviewed & adjusted, as indicated.  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)     Combined forms of age-related cataract     COPD (chronic obstructive pulmonary disease) (H)     Diverticulosis of colon     Enlarged prostate     Family history of malignant neoplasm of gastrointestinal tract     Glaucoma     Essential hypertension     Legal blindness     Osteoarthrosis involving lower leg     Pseudophakia     Retinal pigment epithelial atrophy      "Knee joint replaced by other means     Sensorineural hearing loss (SNHL) of both ears     Spinal stenosis of cervical region     Subjective tinnitus     Branch retinal vein occlusion     Past Surgical History:   Procedure Laterality Date     back surgery for spinal stenosis       KNEE SURGERY         Social History   Substance Use Topics     Smoking status: Former Smoker     Smokeless tobacco: Never Used     Alcohol use No     History reviewed. No pertinent family history.        ROS:  Constitutional, HEENT, cardiovascular, pulmonary, GI, , musculoskeletal, neuro, skin, endocrine and psych systems are negative, except as in HPI or otherwise noted.     This document serves as a record of the services and decisions personally performed and made by Jannie Aparicio MD. It was created on her behalf by Vonda Ellis, a trained medical scribe. The creation of this document is based the provider's statements to the medical scribe.  Vonda Ellis, March 5, 2018 11:54 AM       OBJECTIVE:                                                    /72 (BP Location: Left arm, Patient Position: Chair, Cuff Size: Adult Regular)  Pulse 94  Temp 98.1  F (36.7  C) (Temporal)  Resp 16  Ht 5' 7\" (1.702 m)  Wt 175 lb (79.4 kg)  SpO2 98%  BMI 27.41 kg/m2  Body mass index is 27.41 kg/(m^2).   GENERAL: healthy, alert, well nourished, well hydrated, no distress  SKIN: age related skin changes  : Bladder scan shows around 688 ml in the bladder  PSYCH: Alert and oriented times 3; speech- coherent , normal rate and volume; able to articulate logical thoughts, able to abstract reason, no tangential thoughts, no hallucinations or delusions, affect- normal    Diagnostic test results:  No results found for this or any previous visit (from the past 24 hour(s)).     ASSESSMENT/PLAN:                                                        ICD-10-CM    1. Benign prostatic hyperplasia with urinary obstruction N40.1 INSERT BLADDER CATH, NON-INDWELLING    " N13.8    2. Urinary retention R33.9 INSERT BLADDER CATH, NON-INDWELLING       Will want to drain the bladder today and plan to start taking his prescribed Finasteride. If he is continuing to have this problem after taking this, and before his cystoscopy with Dr. Amos, may want to have a catheter put in.   Will send note to Dr. Amos to inform of the need for cathetor drainage due to obstruction, but was not taking the proscar, only taking terazosin at this time.      Patient instructions discussed with patient    The information in this document, created by the medical scribe for me, accurately reflects the services I personally performed and the decisions made by me. I have reviewed and approved this document for accuracy.   MD Jannie Celaya MD, MD  [facility]

## 2018-03-05 NOTE — NURSING NOTE
"Chief Complaint   Patient presents with     Urinary Problem       Initial /72 (BP Location: Left arm, Patient Position: Chair, Cuff Size: Adult Regular)  Pulse 94  Temp 98.1  F (36.7  C) (Temporal)  Resp 16  Ht 5' 7\" (1.702 m)  Wt 175 lb (79.4 kg)  SpO2 98%  BMI 27.41 kg/m2 Estimated body mass index is 27.41 kg/(m^2) as calculated from the following:    Height as of this encounter: 5' 7\" (1.702 m).    Weight as of this encounter: 175 lb (79.4 kg).  Medication Reconciliation: complete   Sanjuana Villarreal CMA      "

## 2018-03-05 NOTE — PATIENT INSTRUCTIONS
Drained the bladder today    Continue taking all of you medications, including the Finasteride.     Will contact Dr. Amos to see if he has a plan for if this problem comes back in a few weeks before he is seen.   Follow up with Dr. Amos at the end of the month for the cystoscopy.

## 2018-03-06 ENCOUNTER — TELEPHONE (OUTPATIENT)
Dept: FAMILY MEDICINE | Facility: OTHER | Age: 83
End: 2018-03-06

## 2018-03-06 ENCOUNTER — ALLIED HEALTH/NURSE VISIT (OUTPATIENT)
Dept: FAMILY MEDICINE | Facility: OTHER | Age: 83
End: 2018-03-06
Payer: COMMERCIAL

## 2018-03-06 DIAGNOSIS — R33.9 URINARY RETENTION: ICD-10-CM

## 2018-03-06 DIAGNOSIS — R33.9 URINARY RETENTION: Primary | ICD-10-CM

## 2018-03-06 PROCEDURE — 51702 INSERT TEMP BLADDER CATH: CPT

## 2018-03-06 NOTE — TELEPHONE ENCOUNTER
"Called and spoke to patient.   Patient has been having \"dribbiling\" without being about to void.   Patient is feeling weak and is having some flank pain.   Offered same day appointment in [place].   Patient declined stating it is too far to drive.   Patient will be seen in ED for evaluation.   Adelina Whipple RN     "

## 2018-03-06 NOTE — MR AVS SNAPSHOT
"              After Visit Summary   3/6/2018    Guilherme Cervantes    MRN: [id number]           Patient Information     Date Of Birth          6/14/1929        Visit Information        Provider Department      3/6/2018 3:00 PM NL RN TEAM A, [facility]        Today's Diagnoses     Urinary retention           Follow-ups after your visit        Your next 10 appointments already scheduled     Mar 28, 2018 10:00 AM CDT   Return Visit with David Amos MD   [facility]    [address] [facility] [address] [phone]              Who to contact     If you have questions or need follow up information about today's clinic visit or your schedule please contact [facility] directly at [phone].  Normal or non-critical lab and imaging results will be communicated to you by MyChart, letter or phone within 4 business days after the clinic has received the results. If you do not hear from us within 7 days, please contact the clinic through MyChart or phone. If you have a critical or abnormal lab result, we will notify you by phone as soon as possible.  Submit refill requests through [employer] or call your pharmacy and they will forward the refill request to us. Please allow 3 business days for your refill to be completed.          Additional Information About Your Visit        MyChart Information     [employer] lets you send messages to your doctor, view your test results, renew your prescriptions, schedule appointments and more. To sign up, go to [email or web address] . Click on \"Log in\" on the left side of the screen, which will take you to the Welcome page. Then click on \"Sign up Now\" on the right side of the page.     You will be asked to enter the access code listed below, as well as some personal information. Please follow the directions to create your username and password.     Your access code is: [email or web address]  Expires: " 2018 10:41 AM     Your access code will  in 90 days. If you need help or a new code, please call your [place] clinic or [phone].        Care EveryWhere ID     This is your Care EveryWhere ID. This could be used by other organizations to access your [place] medical records  [id number]         Blood Pressure from Last 3 Encounters:   18 124/72   18 122/68   18 149/86    Weight from Last 3 Encounters:   18 175 lb (79.4 kg)   18 177 lb 4 oz (80.4 kg)              We Performed the Following     INSERT BLADDER CATH, TEMP INDWELL SIMPLE (DORANTES)        Primary Care Provider Fax #    [facility] [phone] [address] [facility] [address]        Equal Access to Services     MARGY SONG : Hadii aad ku hadasho Sogianna, waaxda luqadaha, qaybta kaalmada adeedilson, sharita romero . So [facility] [phone].    ATENCIÓN: Si habla español, tiene a perez disposición servicios gratuitos de asistencia lingüística. Ashwini al [phone].    We comply with applicable federal civil rights laws and [place] laws. We do not discriminate on the basis of race, color, national origin, age, disability, sex, sexual orientation, or gender identity.            Thank you!     Thank you for choosing [facility]  for your care. Our goal is always to provide you with excellent care. Hearing back from our patients is one way we can continue to improve our services. Please take a few minutes to complete the written survey that you may receive in the mail after your visit with us. Thank you!             Your Updated Medication List - Protect others around you: Learn how to safely use, store and throw away your medicines at www.disposemymeds.org.          This list is accurate as of 3/6/18  4:03 PM.  Always use your most recent med list.                   Brand Name Dispense Instructions for use Diagnosis    * APIXABAN PO      Take 5 mg by  mouth 2 times daily        * apixaban ANTICOAGULANT 5 MG tablet    ELIQUIS     TAKE ONE TABLET BY MOUTH TWICE A DAY TO PREVENT BLOOD CLOTS        DAILY MULTIVITAMIN PO      TAKE 1 CAPSULE BY MOUTH TWICE A DAY        * dorzolamide 2 % ophthalmic solution    TRUSOPT     Place 1 drop into both eyes 2 times daily        * dorzolamide 2 % ophthalmic solution    TRUSOPT     INSTILL 1 DROP IN EACH EYE TWICE A DAY FOR INCREASED EYE PRESSURE        finasteride 5 MG tablet    PROSCAR     TAKE ONE TABLET BY MOUTH EVERY DAY FOR PROSTATE        FINASTERIDE PO      Take 5 mg by mouth daily        * guaiFENesin 200 MG Tabs tablet    ORGANIDIN     Take 400 mg by mouth 3 times daily as needed for cough        * guaiFENesin 400 MG Tabs      TAKE ONE TABLET BY MOUTH THREE TIMES A DAY AS NEEDED CONGESTION        loratadine 10 MG tablet    CLARITIN     Take 10 mg by mouth daily        * TERAZOSIN HCL PO      Take 10 mg by mouth At Bedtime        * terazosin 10 MG capsule    HYTRIN     TAKE TWO CAPSULES BY MOUTH AT BEDTIME FOR PROSTATE        * triamcinolone 0.1 % cream    KENALOG     Apply topically 2 times daily        * triamcinolone 0.1 % cream    KENALOG     APPLY THIN FILM TO AFFECTED AREA TWICE A DAY AS NEEDED FOR RASH        * Notice:  This list has 10 medication(s) that are the same as other medications prescribed for you. Read the directions carefully, and ask your doctor or other care provider to review them with you.

## 2018-03-06 NOTE — PROGRESS NOTES
Catheter insertion documentation on 3/6/2018:    Guilherme Cervantes presents to the clinic for catheter insertion.  Reason for insertion: urinary retention  Order has been verified. Yes  Catheter successfully inserted into the urethral meatus in the usual sterile fashion without immediate complication.  Type of catheter placed: 16 Georgian indwelling catheter  Urine is yellow in color.  800 cc's of urine output returned.  Balloon was filled with 10cc's of normal saline.  Securement device placed for the catheter.  The patient tolerated the procedure and was instructed to follow up with their PCP or consultant as planned, monitor for catheter dysfunction, monitor for pain or discomfort, return or call for pain, fever, leakage or decreased urine flow and watch for signs of infection.     Attached a leg bag and reviewed with pt and wife how to empty. Encouraged him to call with any questions or concerns.     Spoke with Urology. Pt should keep the thompson in place until cysto.    Next 5 appointments (look out 90 days)     Mar 28, 2018 10:00 AM CDT   [facility]    [address] [facility] [address]   [phone]                Cammy Crawford, [place], BSN

## 2018-03-06 NOTE — TELEPHONE ENCOUNTER
"Reason for call:  Patient reporting a symptom    Symptom or request:  Patient was in yesterday with bladder retention and was a catheterized but now spouse is calling stating he only can urine a \"few dribbles\" and she is concerned with dehydration.  He has some discomfort in right kidney area.  She is calling for recommendation.  He doesn't see Dr Amos until 03-28-18.  Please advise.  Thank you    Duration (how long have symptoms been present): ongoing    Have you been treated for this before? Yes    Additional comments: na    Phone Number patient can be reached at:  Cell number on file:    Telephone Information:   Mobile [phone]       Best Time:  any    Can we leave a detailed message on this number:  YES    Call taken on 3/6/2018 at 9:29 AM by Tri Knutson    "

## 2018-03-08 ENCOUNTER — TELEPHONE (OUTPATIENT)
Dept: FAMILY MEDICINE | Facility: OTHER | Age: 83
End: 2018-03-08

## 2018-03-08 NOTE — TELEPHONE ENCOUNTER
Guilherme Cervantes is a 88 year old male who calls with blood in urine bag.    NURSING ASSESSMENT:  Description:  I spoke with the pt and his wife who states they feel like there is some blood in the urine. Reddish orange  Onset/duration:  Today after a nap  Precip. factors:  Brown placed yesterday  Associated symptoms:  None, no pain, urine is clear  Improves/worsens symptoms:  Did not address  Pain scale (0-10)   0/10    Allergies:   Allergies   Allergen Reactions     Trospium Diarrhea     Yeast Hives     NURSING PLAN: Spoke with Urology nurse and she states blood is expected and as long as it is not a large amount of bright red blood or a clot that is obstruction the catheter to just keep monitoring.    RECOMMENDED DISPOSITION:  Home care advice - Push fluids. Make sure catheter is secured.   Will comply with recommendation: Yes  If further questions/concerns or if symptoms do not improve, worsen or new symptoms develop, call your PCP or [facility] as soon as possible.      Guideline used:   Telephone Triage Protocols for Nurses, Fifth Edition, Whit Crawford RN

## 2018-03-08 NOTE — TELEPHONE ENCOUNTER
Guilherme Cervantes is a 88 year old male who calls with catheter concerns.    NURSING ASSESSMENT:  Description:  Spoke with wife.  He is still having some reddish orange blood in cath tubing.  Have not see much for output.   Onset/duration:  This afternoon  Precip. factors:  Was seen in ED on 02/17/2018.  Brown placed 03/06/2018  Associated symptoms:  Now noticing a blood clot in the tubing.  She tried to wiggle that down but there is still no other output, does not feel like he has to urinate.   Improves/worsens symptoms:  No improvment  Pain scale (0-10)   0/10  LMP/preg/breast feeding:  na  Last exam/Treatment:  03/06/2018  Allergies:   Allergies   Allergen Reactions     Trospium Diarrhea     Yeast Hives       MEDICATIONS:   NA  NURSING PLAN: Nursing advice to patient continue to increase fluids, monitor for another hour or so and if still no urine output needs to be seen in ED.  if pain develops needs to be seen in ED also,  return call with any questions or concerns    Drain bag now so we there is a way of measuring how much out in the next hour or so.  RECOMMENDED DISPOSITION:  Home care advice -   Will comply with recommendation: Yes  If further questions/concerns or if symptoms do not improve, worsen or new symptoms develop, call your PCP or [facility] as soon as possible.      Guideline used:  Telephone Triage Protocols for Nurses, Fifth Edition, Whit Klein, RN, BSN

## 2018-03-13 ENCOUNTER — NURSE TRIAGE (OUTPATIENT)
Dept: NURSING | Facility: CLINIC | Age: 83
End: 2018-03-13

## 2018-03-13 NOTE — TELEPHONE ENCOUNTER
Reason for Disposition    Entire penis is swollen (i.e., edema)    Additional Information    Negative: Followed a genital area injury    Negative: Pain or burning with passing urine is main symptom    Negative: Pain in scrotum or testicle is main symptom    Negative: Swollen scrotum OR lump in the scrotum/groin area    Negative: Pubic lice suspected    Negative: [1] Blood from end of penis AND [2] large amount    Negative: [1] Not circumcised AND [2] foreskin pulled back and stuck    Negative: [1] Looks infected (e.g., draining sore, ulcer, rash is painful to touch) AND [2] fever > 100.5 F (38.1 C)    Negative: [1] Unable to urinate (or only a few drops) > 4 hours AND     [2] bladder feels very full (e.g., palpable bladder or strong urge to urinate)    Negative: [1] Erection AND [2] present > 4 hours    Negative: Patient sounds very sick or weak to the triager    Negative: [1] Pus or bloody discharge from the end of the penis AND [2] fever    Negative: [1] Pain or burning with passing urine AND [2] fever > 100.5 F (38.1 C)    Negative: [1] Pain or burning with passing urine AND [2] side (flank) or back pain present    Protocols used: PENIS AND SCROTUM SYMPTOMS-ADULT-AH    Patient with redness and swelling to the tip of his penis and has a catheter present.  Advised that patient be seen by a provider within the next 4 hours per guideline.  Transferred caller to scheduling to attempt to obtain an appointment at [facility].  Also advised that patient be seen in the ED if no appointment is available.    Ally An RN  [facility]

## 2018-03-16 ENCOUNTER — HOSPITAL ENCOUNTER (EMERGENCY)
Facility: CLINIC | Age: 83
Discharge: HOME | End: 2018-03-16
Attending: EMERGENCY MEDICINE | Admitting: EMERGENCY MEDICINE
Payer: COMMERCIAL

## 2018-03-16 ENCOUNTER — TELEPHONE (OUTPATIENT)
Dept: FAMILY MEDICINE | Facility: OTHER | Age: 83
End: 2018-03-16

## 2018-03-16 VITALS
HEART RATE: 79 BPM | WEIGHT: 168 LBS | DIASTOLIC BLOOD PRESSURE: 91 MMHG | RESPIRATION RATE: 18 BRPM | TEMPERATURE: 97.6 F | SYSTOLIC BLOOD PRESSURE: 137 MMHG | BODY MASS INDEX: 26.31 KG/M2 | OXYGEN SATURATION: 97 %

## 2018-03-16 DIAGNOSIS — M62.81 GENERALIZED MUSCLE WEAKNESS: ICD-10-CM

## 2018-03-16 LAB
ALBUMIN SERPL-MCNC: 2.9 G/DL (ref 3.4–5)
ALBUMIN UR-MCNC: NEGATIVE MG/DL
ALP SERPL-CCNC: 89 U/L (ref 40–150)
ALT SERPL W P-5'-P-CCNC: 14 U/L (ref 0–70)
ANION GAP SERPL CALCULATED.3IONS-SCNC: 6 MMOL/L (ref 3–14)
APPEARANCE UR: CLEAR
AST SERPL W P-5'-P-CCNC: 11 U/L (ref 0–45)
BASOPHILS # BLD AUTO: 0 10E9/L (ref 0–0.2)
BASOPHILS NFR BLD AUTO: 0.2 %
BILIRUB DIRECT SERPL-MCNC: 0.2 MG/DL (ref 0–0.2)
BILIRUB SERPL-MCNC: 0.5 MG/DL (ref 0.2–1.3)
BILIRUB UR QL STRIP: NEGATIVE
BUN SERPL-MCNC: 25 MG/DL (ref 7–30)
CALCIUM SERPL-MCNC: 8.9 MG/DL (ref 8.5–10.1)
CHLORIDE SERPL-SCNC: 106 MMOL/L (ref 94–109)
CO2 SERPL-SCNC: 28 MMOL/L (ref 20–32)
COLOR UR AUTO: YELLOW
CREAT SERPL-MCNC: 1.42 MG/DL (ref 0.66–1.25)
DIFFERENTIAL METHOD BLD: ABNORMAL
EOSINOPHIL # BLD AUTO: 0.2 10E9/L (ref 0–0.7)
EOSINOPHIL NFR BLD AUTO: 1.8 %
ERYTHROCYTE [DISTWIDTH] IN BLOOD BY AUTOMATED COUNT: 14.2 % (ref 10–15)
GFR SERPL CREATININE-BSD FRML MDRD: 47 ML/MIN/1.7M2
GLUCOSE SERPL-MCNC: 112 MG/DL (ref 70–99)
GLUCOSE UR STRIP-MCNC: NEGATIVE MG/DL
HCT VFR BLD AUTO: 38.2 % (ref 40–53)
HGB BLD-MCNC: 11.7 G/DL (ref 13.3–17.7)
HGB UR QL STRIP: ABNORMAL
IMM GRANULOCYTES # BLD: 0 10E9/L (ref 0–0.4)
IMM GRANULOCYTES NFR BLD: 0.1 %
KETONES UR STRIP-MCNC: NEGATIVE MG/DL
LEUKOCYTE ESTERASE UR QL STRIP: ABNORMAL
LYMPHOCYTES # BLD AUTO: 1.1 10E9/L (ref 0.8–5.3)
LYMPHOCYTES NFR BLD AUTO: 13.3 %
MCH RBC QN AUTO: 27.4 PG (ref 26.5–33)
MCHC RBC AUTO-ENTMCNC: 30.6 G/DL (ref 31.5–36.5)
MCV RBC AUTO: 90 FL (ref 78–100)
MONOCYTES # BLD AUTO: 0.7 10E9/L (ref 0–1.3)
MONOCYTES NFR BLD AUTO: 8.8 %
NEUTROPHILS # BLD AUTO: 6.3 10E9/L (ref 1.6–8.3)
NEUTROPHILS NFR BLD AUTO: 75.8 %
NITRATE UR QL: NEGATIVE
PH UR STRIP: 5 PH (ref 5–7)
PLATELET # BLD AUTO: 225 10E9/L (ref 150–450)
POTASSIUM SERPL-SCNC: 4.1 MMOL/L (ref 3.4–5.3)
PROT SERPL-MCNC: 7.2 G/DL (ref 6.8–8.8)
RBC # BLD AUTO: 4.27 10E12/L (ref 4.4–5.9)
RBC #/AREA URNS AUTO: 1 /HPF (ref 0–2)
SODIUM SERPL-SCNC: 140 MMOL/L (ref 133–144)
SOURCE: ABNORMAL
SP GR UR STRIP: 1.01 (ref 1–1.03)
TROPONIN I SERPL-MCNC: <0.015 UG/L (ref 0–0.04)
UROBILINOGEN UR STRIP-MCNC: 0 MG/DL (ref 0–2)
WBC # BLD AUTO: 8.3 10E9/L (ref 4–11)
WBC #/AREA URNS AUTO: 1 /HPF (ref 0–5)

## 2018-03-16 PROCEDURE — 99284 EMERGENCY DEPT VISIT MOD MDM: CPT | Performed by: EMERGENCY MEDICINE

## 2018-03-16 PROCEDURE — 80048 BASIC METABOLIC PNL TOTAL CA: CPT | Performed by: EMERGENCY MEDICINE

## 2018-03-16 PROCEDURE — 81001 URINALYSIS AUTO W/SCOPE: CPT | Performed by: EMERGENCY MEDICINE

## 2018-03-16 PROCEDURE — 87086 URINE CULTURE/COLONY COUNT: CPT | Performed by: EMERGENCY MEDICINE

## 2018-03-16 PROCEDURE — 80076 HEPATIC FUNCTION PANEL: CPT | Performed by: EMERGENCY MEDICINE

## 2018-03-16 PROCEDURE — 93005 ELECTROCARDIOGRAM TRACING: CPT | Performed by: EMERGENCY MEDICINE

## 2018-03-16 PROCEDURE — 99284 EMERGENCY DEPT VISIT MOD MDM: CPT | Mod: Z6 | Performed by: EMERGENCY MEDICINE

## 2018-03-16 PROCEDURE — 84484 ASSAY OF TROPONIN QUANT: CPT | Performed by: EMERGENCY MEDICINE

## 2018-03-16 PROCEDURE — 85025 COMPLETE CBC W/AUTO DIFF WBC: CPT | Performed by: EMERGENCY MEDICINE

## 2018-03-16 ASSESSMENT — ENCOUNTER SYMPTOMS
DIZZINESS: 0
CHILLS: 0
FEVER: 1
WEAKNESS: 1
HEADACHES: 0
NAUSEA: 0
DIARRHEA: 0
SHORTNESS OF BREATH: 1
ABDOMINAL PAIN: 0
VOMITING: 0

## 2018-03-16 NOTE — ED AVS SNAPSHOT
[facility] Emergency Department    [address] [place] [address]    Phone:  [phone]    Fax:  [phone]                                       Guilherme Cervantes   MRN: [id number]    Department:  [facility] Emergency Department   Date of Visit:  3/16/2018           Patient Information     Date Of Birth          6/14/1929        Your diagnoses for this visit were:     Generalized muscle weakness        You were seen by Gabriel Galarza MD.      Follow-up Information     Follow up with [facility] In 3 days.    Why:  If symptoms worsen    Contact information:    [address]  [facility] [address]          Discharge Instructions         Weakness (Uncertain Cause)  Based on your exam today, the exact cause of your weakness is not certain. However, your weakness does not seem to be a sign of a serious illness at this time. Keep an eye on your symptoms and get medical advice as instructed below.  Home care    Rest at home today. Do not over-exert yourself.    Take any medicine as prescribed.    For the next few days, drink extra fluids (unless your healthcare provider wants you to restrict fluids for other reasons). Do not skip meals.  Follow-up care  Follow up with your healthcare provider or as advised.  When to seek medical advice  Call your healthcare provider for any of the following    Worsening of your symptoms    Symptoms don't start getting better within 2 days    Fever of 100.4  F (38  C) or higher, or as directed by your healthcare provider     Call 911  Get emergency medical care for any of these:    Chest, arm, neck, jaw or upper back pain    Trouble breathing    Numbness or weakness of the face, one arm or one leg    Slurred speech, confusion, trouble speaking, walking or seeing    Blood in vomit or stool (black or red color)    Loss of consciousness  Date Last Reviewed: 6/10/2015    7049-8265 [employer]. [address],  [place] [address]. All rights reserved. This information is not intended as a substitute for professional medical care. Always follow your healthcare professional's instructions.          Future Appointments        Provider Department Dept Phone Center    3/28/2018 10:00 AM David Amos MD [facility] [phone] [facility]      24 Hour Appointment Hotline       To make an appointment at any [facility], [place], call [phone]). If you don't have a family doctor or clinic, we will help you find one. [facility] are conveniently located to serve the needs of you and your family.             Review of your medicines      Our records show that you are taking the medicines listed below. If these are incorrect, please call your family doctor or clinic.        Dose / Directions Last dose taken    * APIXABAN PO   Dose:  5 mg        Take 5 mg by mouth 2 times daily   Refills:  0        * apixaban ANTICOAGULANT 5 MG tablet   Commonly known as:  ELIQUIS        TAKE ONE TABLET BY MOUTH TWICE A DAY TO PREVENT BLOOD CLOTS   Refills:  0        DAILY MULTIVITAMIN PO        TAKE 1 CAPSULE BY MOUTH TWICE A DAY   Refills:  0        * dorzolamide 2 % ophthalmic solution   Commonly known as:  TRUSOPT   Dose:  1 drop        Place 1 drop into both eyes 2 times daily   Refills:  0        * dorzolamide 2 % ophthalmic solution   Commonly known as:  TRUSOPT        INSTILL 1 DROP IN EACH EYE TWICE A DAY FOR INCREASED EYE PRESSURE   Refills:  0        finasteride 5 MG tablet   Commonly known as:  PROSCAR        TAKE ONE TABLET BY MOUTH EVERY DAY FOR PROSTATE   Refills:  0        FINASTERIDE PO   Dose:  5 mg        Take 5 mg by mouth daily   Refills:  0        * guaiFENesin 200 MG Tabs tablet   Commonly known as:  ORGANIDIN   Dose:  400 mg        Take 400 mg by mouth 3 times daily as needed for cough   Refills:  0        * guaiFENesin 400 MG Tabs        TAKE ONE TABLET BY MOUTH THREE TIMES A DAY AS  NEEDED CONGESTION   Refills:  0        loratadine 10 MG tablet   Commonly known as:  CLARITIN   Dose:  10 mg        Take 10 mg by mouth daily   Refills:  0        * TERAZOSIN HCL PO   Dose:  10 mg        Take 10 mg by mouth At Bedtime   Refills:  0        * terazosin 10 MG capsule   Commonly known as:  HYTRIN        TAKE TWO CAPSULES BY MOUTH AT BEDTIME FOR PROSTATE   Refills:  0        * triamcinolone 0.1 % cream   Commonly known as:  KENALOG        Apply topically 2 times daily   Refills:  0        * triamcinolone 0.1 % cream   Commonly known as:  KENALOG        APPLY THIN FILM TO AFFECTED AREA TWICE A DAY AS NEEDED FOR RASH   Refills:  0        * Notice:  This list has 10 medication(s) that are the same as other medications prescribed for you. Read the directions carefully, and ask your doctor or other care provider to review them with you.            Procedures and tests performed during your visit     Basic metabolic panel    CBC with platelets differential    EKG 12 lead    Hepatic panel    Troponin I    UA reflex to Microscopic and Culture    Urine Culture Aerobic Bacterial      Orders Needing Specimen Collection     None      Pending Results     Date and Time Order Name Status Description    3/16/2018 1250 Urine Culture Aerobic Bacterial In process             Pending Culture Results     Date and Time Order Name Status Description    3/16/2018 1250 Urine Culture Aerobic Bacterial In process             Pending Results Instructions     If you had any lab results that were not finalized at the time of your Discharge, you can call the ED Lab Result RN at [phone]. You will be contacted by this team for any positive Lab results or changes in treatment. The nurses are available 7 days a week from 10A to 6:30P.  You can leave a message 24 hours per day and they will return your call.        Thank you for choosing [facility]       Thank you for choosing [facility] for your care. Our goal is always to provide you  "with excellent care. Hearing back from our patients is one way we can continue to improve our services. Please take a few minutes to complete the written survey that you may receive in the mail after you visit with us. Thank you!        [employer] Information     [employer] lets you send messages to your doctor, view your test results, renew your prescriptions, schedule appointments and more. To sign up, go to [email or web address] . Click on \"Log in\" on the left side of the screen, which will take you to the Welcome page. Then click on \"Sign up Now\" on the right side of the page.     You will be asked to enter the access code listed below, as well as some personal information. Please follow the directions to create your username and password.     Your access code is: [email or web address]  Expires: 2018 11:41 AM     Your access code will  in 90 days. If you need help or a new code, please call your [place] clinic or [phone].        Care EveryWhere ID     This is your Care EveryWhere ID. This could be used by other organizations to access your [place] medical records  [id number]        Equal Access to Services     MARGY SONG AH: Hadii osvaldo Wetzel, washey mcintosh, qaybjose kaalmajenny millan, sharita lyman. So [facility] [phone].    ATENCIÓN: Si habla español, tiene a perez disposición servicios gratuitos de asistencia lingüística. Ashwini al [phone].    We comply with applicable federal civil rights laws and [place] laws. We do not discriminate on the basis of race, color, national origin, age, disability, sex, sexual orientation, or gender identity.            After Visit Summary       This is your record. Keep this with you and show to your community pharmacist(s) and doctor(s) at your next visit.                  "

## 2018-03-16 NOTE — ED PROVIDER NOTES
"  History     Chief Complaint   Patient presents with     Generalized Weakness     The history is provided by the patient.     Guilherme Cervantes is a 88 year old male with a history of left DVT, he is now on eliquis, who presents to the emergency department with his wife, Ellen, for concerns of generalized weakness for about 1 week. Patient reports that 9 day sago he had a catheter placed for retention of urine. He has been drinking a lot of water and tea. He has noticed in the last day he has been filling the catheter leg bag about every 6 hours. For about the last week he has felt \"run down\" to the point he does not complete his normal daily routine of laundry and dishes. Also he has been unable to get to the [facility] at all and he usually goes 3-4 times a week. He explains that around 0700 this morning he woke up with some shortness of breath with chest heaviness that lasted about 2 hours. He does still complain of some shortness of breath here. Notes that he has had some fever and sweating at night. Patient has had an unintentional weight loss of 10 pounds in the last month. No chills. No new leg swelling. No abdominal pain, diarrhea, nausea, or vomiting. No dizziness or headaches.     Problem List:    Patient Active Problem List    Diagnosis Date Noted     Actinic keratosis 03/05/2018     Priority: Medium     May 11, 2016  Entered By: JOSÉ MIGUEL JONES  Comment: face and scalp       Acute deep vein thrombosis (DVT) (H) 03/05/2018     Priority: Medium     No comments entered for problem.       Asthma without status asthmaticus 03/05/2018     Priority: Medium     No comments entered for problem.       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 Kirit Reyna 5/23/06 visit       Combined forms of age-related cataract 03/05/2018     Priority: Medium     No comments entered for problem.       Diverticulosis of colon 03/05/2018     Priority: Medium     No comments entered for problem.       Enlarged prostate 03/05/2018     Priority: Medium     No comments entered for problem.       Family history of malignant neoplasm of gastrointestinal tract 03/05/2018     Priority: Medium     No comments entered for problem.       Glaucoma 03/05/2018     Priority: Medium     No comments entered for problem.       Essential hypertension 03/05/2018     Priority: Medium     No comments entered for problem.       Legal blindness 03/05/2018     Priority: Medium     No comments entered for problem.       Osteoarthrosis involving lower leg 03/05/2018     Priority: Medium     No comments entered for problem.       Pseudophakia 03/05/2018     Priority: Medium     No comments entered for problem.       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       Spinal stenosis of cervical region 03/05/2018     Priority: Medium     No comments entered for problem.       Subjective tinnitus 03/05/2018     Priority: Medium     No comments entered for problem.       Branch retinal vein occlusion 03/05/2018     Priority: Medium     No comments entered for  problem.       COPD (chronic obstructive pulmonary disease) (H) 01/01/2014     Priority: Medium     [facility]          Past Medical History:    Past Medical History:   Diagnosis Date     DVT (deep venous thrombosis) (H)        Past Surgical History:    Past Surgical History:   Procedure Laterality Date     back surgery for spinal stenosis       KNEE SURGERY         Family History:    No family history on file.    Social History:  Marital Status:   [2]  Social History   Substance Use Topics     Smoking status: Former Smoker     Smokeless tobacco: Never Used     Alcohol use No        Medications:      apixaban ANTICOAGULANT (ELIQUIS) 5 MG tablet   dorzolamide (TRUSOPT) 2 % ophthalmic solution   finasteride (PROSCAR) 5 MG tablet   guaiFENesin 400 MG TABS   terazosin (HYTRIN) 10 MG capsule   triamcinolone (KENALOG) 0.1 % cream   Multiple Vitamins-Minerals (DAILY MULTIVITAMIN PO)   APIXABAN PO   dorzolamide (TRUSOPT) 2 % ophthalmic solution   FINASTERIDE PO   guaiFENesin (ORGANIDIN) 200 MG TABS tablet   TERAZOSIN HCL PO   loratadine (CLARITIN) 10 MG tablet   triamcinolone (KENALOG) 0.1 % cream         Review of Systems   Constitutional: Positive for fever (at night with night sweats). Negative for chills.   HENT: Negative for congestion.    Respiratory: Positive for shortness of breath (this morning around 0700 with some cheat heaviness that last about 2 hours).    Cardiovascular: Negative for leg swelling (no new swelling, has a blood clot in the left leg and is on eliquis for this).   Gastrointestinal: Negative for abdominal pain, diarrhea, nausea and vomiting.   Genitourinary:        Patient has been filling the catheter bag about every 6 hours.   Neurological: Positive for weakness (generalized). Negative for dizziness and headaches.   All other systems reviewed and are negative.      Physical Exam   BP: 125/84  Pulse: 94  Temp: 97.6  F (36.4  C)  Resp: 18  Weight: 76.2 kg (168  lb)  SpO2: 98 %      Physical Exam  Vitals reviewed  NAD  HEENT:NC/Atraumatic, EOMI, anicteric, PERRL and symmetric, mucous membranes moist, Ext ears nl, OP clear  Chest/PULM: atraumatic, NT to palpation, no resp distress, CTA Bilaterally, no wheezes, crackles.  CV: rrr without m/r/g S1S2, peripheral pulses normal  ABD: soft NT, non distended, no guarding or rebound  Extremities: atraumatic, no edema, full rom  : deferred  Neuro: CN 2-12 grossly intact, motor and sensation grossly intact      ED Course     ED Course     Procedures  Given cp earlier an ecg and troponin performed.  Given generalized weakness, cbc, bmp ordered.                Results for orders placed or performed during the hospital encounter of 03/16/18 (from the past 24 hour(s))   Basic metabolic panel   Result Value Ref Range    Sodium 140 133 - 144 mmol/L    Potassium 4.1 3.4 - 5.3 mmol/L    Chloride 106 94 - 109 mmol/L    Carbon Dioxide 28 20 - 32 mmol/L    Anion Gap 6 3 - 14 mmol/L    Glucose 112 (H) 70 - 99 mg/dL    Urea Nitrogen 25 7 - 30 mg/dL    Creatinine 1.42 (H) 0.66 - 1.25 mg/dL    GFR Estimate 47 (L) >60 mL/min/1.7m2    GFR Estimate If Black 57 (L) >60 mL/min/1.7m2    Calcium 8.9 8.5 - 10.1 mg/dL   CBC with platelets differential   Result Value Ref Range    WBC 8.3 4.0 - 11.0 10e9/L    RBC Count 4.27 (L) 4.4 - 5.9 10e12/L    Hemoglobin 11.7 (L) 13.3 - 17.7 g/dL    Hematocrit 38.2 (L) 40.0 - 53.0 %    MCV 90 78 - 100 fl    MCH 27.4 26.5 - 33.0 pg    MCHC 30.6 (L) 31.5 - 36.5 g/dL    RDW 14.2 10.0 - 15.0 %    Platelet Count 225 150 - 450 10e9/L    Diff Method Automated Method     % Neutrophils 75.8 %    % Lymphocytes 13.3 %    % Monocytes 8.8 %    % Eosinophils 1.8 %    % Basophils 0.2 %    % Immature Granulocytes 0.1 %    Absolute Neutrophil 6.3 1.6 - 8.3 10e9/L    Absolute Lymphocytes 1.1 0.8 - 5.3 10e9/L    Absolute Monocytes 0.7 0.0 - 1.3 10e9/L    Absolute Eosinophils 0.2 0.0 - 0.7 10e9/L    Absolute Basophils 0.0 0.0 - 0.2 10e9/L  "   Abs Immature Granulocytes 0.0 0 - 0.4 10e9/L   Hepatic panel   Result Value Ref Range    Bilirubin Direct 0.2 0.0 - 0.2 mg/dL    Bilirubin Total 0.5 0.2 - 1.3 mg/dL    Albumin 2.9 (L) 3.4 - 5.0 g/dL    Protein Total 7.2 6.8 - 8.8 g/dL    Alkaline Phosphatase 89 40 - 150 U/L    ALT 14 0 - 70 U/L    AST 11 0 - 45 U/L   Troponin I   Result Value Ref Range    Troponin I ES <0.015 0.000 - 0.045 ug/L   UA reflex to Microscopic and Culture   Result Value Ref Range    Color Urine Yellow     Appearance Urine Clear     Glucose Urine Negative NEG^Negative mg/dL    Bilirubin Urine Negative NEG^Negative    Ketones Urine Negative NEG^Negative mg/dL    Specific Gravity Urine 1.011 1.003 - 1.035    Blood Urine Moderate (A) NEG^Negative    pH Urine 5.0 5.0 - 7.0 pH    Protein Albumin Urine Negative NEG^Negative mg/dL    Urobilinogen mg/dL 0.0 0.0 - 2.0 mg/dL    Nitrite Urine Negative NEG^Negative    Leukocyte Esterase Urine Moderate (A) NEG^Negative    Source Unspecified Urine     RBC Urine 1 0 - 2 /HPF    WBC Urine 1 0 - 5 /HPF       Medications - No data to display    Assessments & Plan (with Medical Decision Making)     I have reviewed the nursing notes.    I have reviewed the findings, diagnosis, plan and need for follow up with the patient.  The patient's labs are reassuring.  He was up out of bed with his hat and coat on when I walked in to reevaluate and he said \"I am full of piss vinegar and ready to go. I feel good\".    The differential diagnosis, treatment options, risks and follow up discussed with a competent patient and/or competent family member who agrees with the plan.    Assessment:  Generalized weakness, uncertain etiology    Plan:  Discharge home, return to ER precautions, follow-up with primary care    New Prescriptions    No medications on file       Final diagnoses:   None     This document serves as a record of services personally performed by Gabriel Galarza MD. It was created on their behalf by " Cristina Luis, a trained medical scribe. The creation of this record is based on the provider's personal observations and the statements of the patient. This document has been checked and approved by the attending provider.  Note: Chart documentation done in part with Dragon Voice Recognition software. Although reviewed after completion, some word and grammatical errors may remain.  3/16/2018   [facility] EMERGENCY DEPARTMENT     Gabriel Galarza MD  03/16/18 1861

## 2018-03-16 NOTE — TELEPHONE ENCOUNTER
Reason for call:  Patient reporting a symptom    Symptom or request: pt has a catheter and it is filling quickly and he is very weak    Duration (how long have symptoms been present): cath placed last wed, bag fills in 6hrs and pt has weakness since cath has been placed.   Wife wondering if she should bring him to the ER.  Please advise.  thanks    Have you been treated for this before? Yes    Additional comments: none    Phone Number patient can be reached at:  Home number on file [phone] (home)    Best Time:  any    Can we leave a detailed message on this number:  YES    Call taken on 3/16/2018 at 10:26 AM by Honey Kat

## 2018-03-16 NOTE — TELEPHONE ENCOUNTER
Guilherme Cervantes is a 88 year old male who calls with increased urine output and new weakness.    NURSING ASSESSMENT:  Description:  Brown placed 03/06/2018. Filling urine bag about every 6 hours. Has lost 12 pounds since middle of February. Feeling weak at home and having labored breathing. Light yellow urine.   Onset/duration:  Before today  Precip. factors:  Enlarged prostate  Associated symptoms:  Weak, labored breathing  Improves/worsens symptoms:  Same   Pain scale (0-10)   0/10  Last exam/Treatment:  03/05/2018  Allergies:   Allergies   Allergen Reactions     Trospium Diarrhea     Yeast Hives     NURSING PLAN: Nursing advice to patient to go to ED for weakness, weight loss and labored breathing    RECOMMENDED DISPOSITION:  To ED, another person to drive - for evaluation   Will comply with recommendation: Yes  If further questions/concerns or if symptoms do not improve, worsen or new symptoms develop, call your PCP or [facility] as soon as possible.    NOTES:  Disposition was determined by the first positive assessment question, therefore all previous assessment questions were negative    Guideline used:  Telephone Triage Protocols for Nurses, Fifth Edition, Whit Palafox  Genital Problems, Males  Urinary Catheter/nephrostomy tube problems  Nursing judgment    Mahogany Villalpando, RN, BSN

## 2018-03-16 NOTE — ED NOTES
He had a catheter placed on Wednesday and has been drinking a lot of water and tea to keep it flushed out.  This morning he complains of weakness and his wife is concerned about electrolyte imbalance.

## 2018-03-16 NOTE — ED NOTES
"Pt had catheter placed last Wednesday and has just not been doing very well.  He feels weak.  Today he noted chest pressure and SOB x a few hrs.  He went to bed and woke feeling still SOB, but chest pressure just about gone.      He is passing good urine.  He does not have any idea what the plan is as far as what is next.  Pt has a cystoscope scheduled 3/28.      Wife states he is \"just going down hill\" since the catheter was placed.    "

## 2018-03-16 NOTE — ED AVS SNAPSHOT
[facility] Emergency Department    [address] [place] [address]    Phone:  [phone]    Fax:  [phone]                                       Guilherme Cervantes   MRN: [id number]    Department:  [facility] Emergency Department   Date of Visit:  3/16/2018           After Visit Summary Signature Page     I have received my discharge instructions, and my questions have been answered. I have discussed any challenges I see with this plan with the nurse or doctor.    ..........................................................................................................................................  Patient/Patient Representative Signature      ..........................................................................................................................................  Patient Representative Print Name and Relationship to Patient    ..................................................               ................................................  Date                                            Time    ..........................................................................................................................................  Reviewed by Signature/Title    ...................................................              ..............................................  Date                                                            Time

## 2018-03-16 NOTE — DISCHARGE INSTRUCTIONS
Weakness (Uncertain Cause)  Based on your exam today, the exact cause of your weakness is not certain. However, your weakness does not seem to be a sign of a serious illness at this time. Keep an eye on your symptoms and get medical advice as instructed below.  Home care    Rest at home today. Do not over-exert yourself.    Take any medicine as prescribed.    For the next few days, drink extra fluids (unless your healthcare provider wants you to restrict fluids for other reasons). Do not skip meals.  Follow-up care  Follow up with your healthcare provider or as advised.  When to seek medical advice  Call your healthcare provider for any of the following    Worsening of your symptoms    Symptoms don't start getting better within 2 days    Fever of 100.4  F (38  C) or higher, or as directed by your healthcare provider     Call 911  Get emergency medical care for any of these:    Chest, arm, neck, jaw or upper back pain    Trouble breathing    Numbness or weakness of the face, one arm or one leg    Slurred speech, confusion, trouble speaking, walking or seeing    Blood in vomit or stool (black or red color)    Loss of consciousness  Date Last Reviewed: 6/10/2015    [id number] [employer]. [address] [place] [address], [place] [address]. All rights reserved. This information is not intended as a substitute for professional medical care. Always follow your healthcare professional's instructions.

## 2018-03-17 LAB
BACTERIA SPEC CULT: NO GROWTH
Lab: NORMAL
SPECIMEN SOURCE: NORMAL

## 2018-04-13 ENCOUNTER — TRANSFERRED RECORDS (OUTPATIENT)
Dept: HEALTH INFORMATION MANAGEMENT | Facility: CLINIC | Age: 83
End: 2018-04-13

## 2018-04-18 ENCOUNTER — TRANSFERRED RECORDS (OUTPATIENT)
Dept: HEALTH INFORMATION MANAGEMENT | Facility: CLINIC | Age: 83
End: 2018-04-18

## 2021-10-01 PROBLEM — M17.10 UNILATERAL PRIMARY OSTEOARTHRITIS, UNSPECIFIED KNEE: Status: ACTIVE | Noted: 2018-03-05
